# Patient Record
Sex: FEMALE | Race: WHITE | Employment: FULL TIME | URBAN - METROPOLITAN AREA
[De-identification: names, ages, dates, MRNs, and addresses within clinical notes are randomized per-mention and may not be internally consistent; named-entity substitution may affect disease eponyms.]

---

## 2021-04-15 ENCOUNTER — TRANSCRIBE ORDER (OUTPATIENT)
Dept: SCHEDULING | Age: 41
End: 2021-04-15

## 2021-04-15 DIAGNOSIS — R07.9 CHEST PAIN, UNSPECIFIED: Primary | ICD-10-CM

## 2021-04-23 ENCOUNTER — HOSPITAL ENCOUNTER (OUTPATIENT)
Dept: NON INVASIVE DIAGNOSTICS | Age: 41
Discharge: HOME | End: 2021-04-23
Payer: COMMERCIAL

## 2021-04-23 DIAGNOSIS — R07.9 CHEST PAIN, UNSPECIFIED: ICD-10-CM

## 2021-04-23 LAB
ECHO AO ROOT DIAM: 3.09 CM
ECHO AV AREA PEAK VELOCITY: 2.28 CM2
ECHO AV PEAK GRADIENT: 9.63 MMHG
ECHO AV PEAK VELOCITY: 155.17 CM/S
ECHO IVC PROX: 1.67 CM
ECHO LA AREA 4C: 17.32 CM2
ECHO LA MAJOR AXIS: 3.09 CM
ECHO LA VOL 2C: 54.89 ML (ref 22–52)
ECHO LA VOL 4C: 48.48 ML (ref 22–52)
ECHO LA VOL BP: 56.46 ML (ref 22–52)
ECHO LV INTERNAL DIMENSION DIASTOLIC: 4.26 CM (ref 3.9–5.3)
ECHO LV INTERNAL DIMENSION SYSTOLIC: 2.53 CM
ECHO LV IVSD: 0.6 CM (ref 0.6–0.9)
ECHO LV MASS 2D: 78.5 G (ref 67–162)
ECHO LV POSTERIOR WALL DIASTOLIC: 0.69 CM (ref 0.6–0.9)
ECHO LVOT DIAM: 1.99 CM
ECHO LVOT PEAK GRADIENT: 5.14 MMHG
ECHO LVOT PEAK VELOCITY: 113.37 CM/S
ECHO MV A VELOCITY: 80.51 CM/S
ECHO MV E DECELERATION TIME (DT): 165.88 MS
ECHO MV E VELOCITY: 85.24 CM/S
ECHO MV E/A RATIO: 1.06
ECHO PV PEAK INSTANTANEOUS GRADIENT SYSTOLIC: 2.3 MMHG
ECHO RV INTERNAL DIMENSION: 2.81 CM
ECHO RV TAPSE: 2.08 CM (ref 1.5–2)
ECHO TV REGURGITANT MAX VELOCITY: 178.97 CM/S
ECHO TV REGURGITANT PEAK GRADIENT: 12.81 MMHG
LA VOL DISK BP: 53.13 ML (ref 22–52)

## 2021-04-23 PROCEDURE — 93306 TTE W/DOPPLER COMPLETE: CPT

## 2021-12-07 ENCOUNTER — HOSPITAL ENCOUNTER (OUTPATIENT)
Dept: LAB | Age: 41
Discharge: HOME | End: 2021-12-07
Payer: OTHER GOVERNMENT

## 2021-12-07 ENCOUNTER — TRANSCRIBE ORDER (OUTPATIENT)
Dept: REGISTRATION | Age: 41
End: 2021-12-07

## 2021-12-07 DIAGNOSIS — D64.9 ANEMIA: ICD-10-CM

## 2021-12-07 DIAGNOSIS — D64.9 ANEMIA: Primary | ICD-10-CM

## 2021-12-07 LAB
HCT VFR BLD AUTO: 23.4 % (ref 36–46)
HGB BLD-MCNC: 7.2 G/DL (ref 13.5–17.5)

## 2021-12-07 PROCEDURE — 85018 HEMOGLOBIN: CPT

## 2021-12-07 PROCEDURE — 86920 COMPATIBILITY TEST SPIN: CPT

## 2021-12-07 PROCEDURE — 86901 BLOOD TYPING SEROLOGIC RH(D): CPT

## 2021-12-07 PROCEDURE — 86900 BLOOD TYPING SEROLOGIC ABO: CPT

## 2021-12-08 ENCOUNTER — HOSPITAL ENCOUNTER (OUTPATIENT)
Dept: INFUSION THERAPY | Age: 41
Discharge: HOME | End: 2021-12-08
Payer: OTHER GOVERNMENT

## 2021-12-08 VITALS
HEART RATE: 87 BPM | DIASTOLIC BLOOD PRESSURE: 76 MMHG | TEMPERATURE: 98.5 F | OXYGEN SATURATION: 100 % | SYSTOLIC BLOOD PRESSURE: 124 MMHG | RESPIRATION RATE: 18 BRPM

## 2021-12-08 LAB
HCT VFR BLD AUTO: 27.8 % (ref 36–46)
HGB BLD-MCNC: 8.6 G/DL (ref 13.5–17.5)

## 2021-12-08 PROCEDURE — 36415 COLL VENOUS BLD VENIPUNCTURE: CPT

## 2021-12-08 PROCEDURE — 85018 HEMOGLOBIN: CPT

## 2021-12-08 PROCEDURE — P9016 RBC LEUKOCYTES REDUCED: HCPCS

## 2021-12-08 PROCEDURE — 36430 TRANSFUSION BLD/BLD COMPNT: CPT

## 2021-12-08 RX ORDER — SODIUM CHLORIDE 9 MG/ML
250 INJECTION, SOLUTION INTRAVENOUS AS NEEDED
Status: DISCONTINUED | OUTPATIENT
Start: 2021-12-08 | End: 2021-12-10 | Stop reason: HOSPADM

## 2021-12-08 NOTE — PROGRESS NOTES
IV site discontinued discharged to go by [facility] office for further instructions

## 2021-12-08 NOTE — PROGRESS NOTES
Plan of care dicussed, IV started in right wrist with # 20 abbocath via [facility], lungs clear

## 2021-12-09 LAB
ABO + RH BLD: NORMAL
BLD PROD TYP BPU: NORMAL
BLOOD GROUP ANTIBODIES SERPL: NEGATIVE
BPU ID: NORMAL
CROSSMATCH RESULT,%XM: NORMAL
SPECIMEN EXP DATE BLD: NORMAL
STATUS OF UNIT,%ST: NORMAL
TRANSFUSION STATUS PATIENT QL: NORMAL
UNIT DIVISION, %UDIV: 0
UNIT TAG COMMENT,%CM: NORMAL

## 2021-12-14 ENCOUNTER — HOSPITAL ENCOUNTER (OUTPATIENT)
Dept: INFUSION THERAPY | Age: 41
Discharge: HOME | End: 2021-12-14
Payer: OTHER GOVERNMENT

## 2021-12-14 VITALS
HEART RATE: 88 BPM | TEMPERATURE: 97.8 F | RESPIRATION RATE: 18 BRPM | SYSTOLIC BLOOD PRESSURE: 117 MMHG | OXYGEN SATURATION: 99 % | DIASTOLIC BLOOD PRESSURE: 87 MMHG

## 2021-12-14 PROCEDURE — 74011250636 HC RX REV CODE- 250/636: Performed by: FAMILY MEDICINE

## 2021-12-14 PROCEDURE — 96365 THER/PROPH/DIAG IV INF INIT: CPT

## 2021-12-14 RX ORDER — MONTELUKAST SODIUM 10 MG/1
10 TABLET ORAL DAILY
COMMUNITY

## 2021-12-14 RX ADMIN — IRON SUCROSE 300 MG: 20 INJECTION, SOLUTION INTRAVENOUS at 10:24

## 2022-01-26 ENCOUNTER — HOSPITAL ENCOUNTER (OUTPATIENT)
Dept: INFUSION THERAPY | Age: 42
Discharge: HOME | End: 2022-01-26
Payer: OTHER GOVERNMENT

## 2022-01-26 VITALS
TEMPERATURE: 97.8 F | OXYGEN SATURATION: 100 % | SYSTOLIC BLOOD PRESSURE: 149 MMHG | HEART RATE: 80 BPM | RESPIRATION RATE: 20 BRPM | DIASTOLIC BLOOD PRESSURE: 88 MMHG

## 2022-01-26 PROCEDURE — 74011250636 HC RX REV CODE- 250/636: Performed by: FAMILY MEDICINE

## 2022-01-26 PROCEDURE — 74011000258 HC RX REV CODE- 258: Performed by: FAMILY MEDICINE

## 2022-01-26 PROCEDURE — 96365 THER/PROPH/DIAG IV INF INIT: CPT

## 2022-01-26 RX ORDER — NORETHINDRONE ACETATE AND ETHINYL ESTRADIOL 1; .02 MG/1; MG/1
1 TABLET ORAL DAILY
COMMUNITY

## 2022-01-26 RX ADMIN — IRON SUCROSE 300 MG: 20 INJECTION, SOLUTION INTRAVENOUS at 08:40

## 2023-03-09 ENCOUNTER — HOSPITAL ENCOUNTER (EMERGENCY)
Age: 43
Discharge: HOME | End: 2023-03-09
Attending: EMERGENCY MEDICINE
Payer: COMMERCIAL

## 2023-03-09 ENCOUNTER — APPOINTMENT (OUTPATIENT)
Dept: GENERAL RADIOLOGY | Age: 43
End: 2023-03-09
Attending: EMERGENCY MEDICINE
Payer: COMMERCIAL

## 2023-03-09 VITALS
SYSTOLIC BLOOD PRESSURE: 148 MMHG | RESPIRATION RATE: 18 BRPM | BODY MASS INDEX: 23.04 KG/M2 | HEART RATE: 86 BPM | OXYGEN SATURATION: 99 % | HEIGHT: 63 IN | WEIGHT: 130 LBS | DIASTOLIC BLOOD PRESSURE: 98 MMHG | TEMPERATURE: 97.9 F

## 2023-03-09 DIAGNOSIS — R03.0 ELEVATED BLOOD PRESSURE READING WITHOUT DIAGNOSIS OF HYPERTENSION: Primary | ICD-10-CM

## 2023-03-09 DIAGNOSIS — R42 DIZZINESS: ICD-10-CM

## 2023-03-09 DIAGNOSIS — R07.89 ATYPICAL CHEST PAIN: ICD-10-CM

## 2023-03-09 DIAGNOSIS — H57.11 PAIN OF RIGHT EYE: ICD-10-CM

## 2023-03-09 LAB
ALBUMIN SERPL-MCNC: 3.9 G/DL (ref 3.5–5)
ALBUMIN/GLOB SERPL: 1.2 (ref 1.1–2.2)
ALP SERPL-CCNC: 64 U/L (ref 45–117)
ALT SERPL-CCNC: 31 U/L (ref 12–78)
ANION GAP SERPL CALC-SCNC: 8 MMOL/L (ref 5–15)
AST SERPL W P-5'-P-CCNC: 16 U/L (ref 15–37)
BASOPHILS # BLD: 0.1 K/UL (ref 0–0.1)
BASOPHILS NFR BLD: 1 % (ref 0–1)
BILIRUB SERPL-MCNC: 0.4 MG/DL (ref 0.2–1)
BUN SERPL-MCNC: 8 MG/DL (ref 6–20)
BUN/CREAT SERPL: 11 (ref 12–20)
CA-I BLD-MCNC: 9.1 MG/DL (ref 8.5–10.1)
CHLORIDE SERPL-SCNC: 103 MMOL/L (ref 97–108)
CO2 SERPL-SCNC: 27 MMOL/L (ref 21–32)
CREAT SERPL-MCNC: 0.75 MG/DL (ref 0.55–1.02)
DIFFERENTIAL METHOD BLD: ABNORMAL
EOSINOPHIL # BLD: 0.2 K/UL (ref 0–0.4)
EOSINOPHIL NFR BLD: 2 % (ref 0–7)
ERYTHROCYTE [DISTWIDTH] IN BLOOD BY AUTOMATED COUNT: 11.7 % (ref 11.5–14.5)
GLOBULIN SER CALC-MCNC: 3.2 G/DL (ref 2–4)
GLUCOSE SERPL-MCNC: 102 MG/DL (ref 65–100)
HCT VFR BLD AUTO: 42.2 % (ref 35–47)
HGB BLD-MCNC: 14.6 G/DL (ref 11.5–16)
IMM GRANULOCYTES # BLD AUTO: 0.1 K/UL (ref 0–0.04)
IMM GRANULOCYTES NFR BLD AUTO: 1 % (ref 0–0.5)
LYMPHOCYTES # BLD: 2.3 K/UL (ref 0.8–3.5)
LYMPHOCYTES NFR BLD: 30 % (ref 12–49)
MCH RBC QN AUTO: 31.5 PG (ref 26–34)
MCHC RBC AUTO-ENTMCNC: 34.6 G/DL (ref 30–36.5)
MCV RBC AUTO: 91.1 FL (ref 80–99)
MONOCYTES # BLD: 0.7 K/UL (ref 0–1)
MONOCYTES NFR BLD: 9 % (ref 5–13)
NEUTS SEG # BLD: 4.3 K/UL (ref 1.8–8)
NEUTS SEG NFR BLD: 57 % (ref 32–75)
NRBC # BLD: 0 K/UL (ref 0–0.01)
NRBC BLD-RTO: 0 PER 100 WBC
PLATELET # BLD AUTO: 229 K/UL (ref 150–400)
PMV BLD AUTO: 10.3 FL (ref 8.9–12.9)
POTASSIUM SERPL-SCNC: 3.5 MMOL/L (ref 3.5–5.1)
PROT SERPL-MCNC: 7.1 G/DL (ref 6.4–8.2)
RBC # BLD AUTO: 4.63 M/UL (ref 3.8–5.2)
SODIUM SERPL-SCNC: 138 MMOL/L (ref 136–145)
TROPONIN I SERPL HS-MCNC: 5 NG/L (ref 0–51)
WBC # BLD AUTO: 7.6 K/UL (ref 3.6–11)

## 2023-03-09 PROCEDURE — 84484 ASSAY OF TROPONIN QUANT: CPT

## 2023-03-09 PROCEDURE — 99285 EMERGENCY DEPT VISIT HI MDM: CPT

## 2023-03-09 PROCEDURE — 71045 X-RAY EXAM CHEST 1 VIEW: CPT

## 2023-03-09 PROCEDURE — 80053 COMPREHEN METABOLIC PANEL: CPT

## 2023-03-09 PROCEDURE — 36415 COLL VENOUS BLD VENIPUNCTURE: CPT

## 2023-03-09 PROCEDURE — 93005 ELECTROCARDIOGRAM TRACING: CPT

## 2023-03-09 PROCEDURE — 85025 COMPLETE CBC W/AUTO DIFF WBC: CPT

## 2023-03-09 RX ORDER — ESCITALOPRAM OXALATE 10 MG/1
TABLET ORAL
COMMUNITY

## 2023-03-09 NOTE — ED PROVIDER NOTES
[facility] EMERGENCY DEPT  EMERGENCY DEPARTMENT HISTORY AND PHYSICAL EXAM      Date: 3/9/2023  Patient Name: Joanie Marshall  MRN: [id number]  [place]: 1980  Date of evaluation: 3/9/2023  Provider: Nataly Johnson MD   Note Started: 12:53 PM 3/9/23    HISTORY OF PRESENT ILLNESS     Chief Complaint   Patient presents with    Chest Pain    Dizziness       History Provided By: Patient    HPI: Joanie Marshall is a 37 y.o. female with history of heavy menses and anemia, has had dizziness and chest pain off and on for past two weeks. She feels weak and shaky. She denies Abd pain, NVD, cough, cold or congestion. Pain in her chest is transient coming and going lasting seconds to minutes and is substernal non-radiating but can be sharp. She also had an episode a right eye pain that was behind her right eye,without vision changes at that time. Additionally though, this morning she had a shifting sensation of her vision that was transient. Pt has had a remote history of anemia  requiring transfusions prior to her hysterectomy, but none since that time. PAST MEDICAL HISTORY   Past Medical History:  Past Medical History:   Diagnosis Date    Anemia     Asthma        Past Surgical History:  Past Surgical History:   Procedure Laterality Date    HX GYN      HX HYSTERECTOMY      HX MYOMECTOMY      HX TONSIL AND ADENOIDECTOMY         Family History:  Family History   Problem Relation Age of Onset    Hypertension Mother     No Known Problems Father        Social History:  Social History     Tobacco Use    Smoking status: Never    Smokeless tobacco: Never   Vaping Use    Vaping Use: Never used   Substance Use Topics    Alcohol use: Yes     Alcohol/week: 2.0 standard drinks     Types: 2 Glasses of wine per week     Comment: weekly    Drug use: Never       Allergies:   Allergies   Allergen Reactions    Iron Hives       PCP: Daniel Roman NP    Current Meds:   Discharge Medication List as of 3/9/2023  3:29 PM        CONTINUE these medications which have NOT CHANGED    Details   escitalopram oxalate (LEXAPRO) 10 mg tablet escitalopram 10 mg tablet   ORAL TAKE 1 TABLET ONCE DAY FOR ANXIETY., Historical Med      norethindrone-ethinyl estradiol (Junel 1/20, 21,) 1-20 mg-mcg tablet Take 1 Tablet by mouth daily. , Historical Med      montelukast (Singulair) 10 mg tablet Take 10 mg by mouth daily. Indications: controller medication for asthma, seasonal runny nose, Historical Med             PHYSICAL EXAM     ED Triage Vitals [03/09/23 1248]   ED Encounter Vitals Group      BP (!) 146/119      Pulse (Heart Rate) 94      Resp Rate 18      Temp       Temp src       O2 Sat (%) 100 %      Weight 130 lb      Height 5' 3\"      Physical Exam  Vitals and nursing note reviewed. Constitutional:       General: She is not in acute distress. Appearance: Normal appearance. She is not ill-appearing or toxic-appearing. HENT:      Head: Normocephalic. Mouth/Throat:      Mouth: Mucous membranes are moist.   Eyes:      Extraocular Movements: Extraocular movements intact. Conjunctiva/sclera: Conjunctivae normal.      Pupils: Pupils are equal, round, and reactive to light. Cardiovascular:      Rate and Rhythm: Normal rate and regular rhythm. Pulses: Normal pulses. Pulmonary:      Effort: Pulmonary effort is normal.   Abdominal:      General: Abdomen is flat. Palpations: Abdomen is soft. Tenderness: There is no abdominal tenderness. Musculoskeletal:         General: No swelling. Normal range of motion. Cervical back: Normal range of motion. Skin:     General: Skin is warm. Neurological:      General: No focal deficit present. Mental Status: She is alert and oriented to person, place, and time. Cranial Nerves: No cranial nerve deficit.    Psychiatric:         Mood and Affect: Mood normal.         SCREENINGS        No data recorded      LAB, EKG AND DIAGNOSTIC RESULTS   Labs:  Recent Results (from the past 12 hour(s))   EKG, 12 LEAD, INITIAL    Collection Time: 03/09/23 12:42 PM   Result Value Ref Range    Ventricular Rate 88 BPM    Atrial Rate 88 BPM    P-R Interval 136 ms    QRS Duration 86 ms    Q-T Interval 354 ms    QTC Calculation (Bezet) 429 ms    Calculated P Axis 71 degrees    Calculated R Axis 64 degrees    Calculated T Axis 50 degrees    Diagnosis Sinus rhythm    CBC WITH AUTOMATED DIFF    Collection Time: 03/09/23  1:00 PM   Result Value Ref Range    WBC 7.6 3.6 - 11.0 K/uL    RBC 4.63 3.80 - 5.20 M/uL    HGB 14.6 11.5 - 16.0 g/dL    HCT 42.2 35.0 - 47.0 %    MCV 91.1 80.0 - 99.0 FL    MCH 31.5 26.0 - 34.0 PG    MCHC 34.6 30.0 - 36.5 g/dL    RDW 11.7 11.5 - 14.5 %    PLATELET 936 437 - 237 K/uL    MPV 10.3 8.9 - 12.9 FL    NRBC 0.0 0.0  WBC    ABSOLUTE NRBC 0.00 0.00 - 0.01 K/uL    NEUTROPHILS 57 32 - 75 %    LYMPHOCYTES 30 12 - 49 %    MONOCYTES 9 5 - 13 %    EOSINOPHILS 2 0 - 7 %    BASOPHILS 1 0 - 1 %    IMMATURE GRANULOCYTES 1 (H) 0 - 0.5 %    ABS. NEUTROPHILS 4.3 1.8 - 8.0 K/UL    ABS. LYMPHOCYTES 2.3 0.8 - 3.5 K/UL    ABS. MONOCYTES 0.7 0.0 - 1.0 K/UL    ABS. EOSINOPHILS 0.2 0.0 - 0.4 K/UL    ABS. BASOPHILS 0.1 0.0 - 0.1 K/UL    ABS. IMM. GRANS. 0.1 (H) 0.00 - 0.04 K/UL    DF AUTOMATED     METABOLIC PANEL, COMPREHENSIVE    Collection Time: 03/09/23  1:00 PM   Result Value Ref Range    Sodium 138 136 - 145 mmol/L    Potassium 3.5 3.5 - 5.1 mmol/L    Chloride 103 97 - 108 mmol/L    CO2 27 21 - 32 mmol/L    Anion gap 8 5 - 15 mmol/L    Glucose 102 (H) 65 - 100 mg/dL    BUN 8 6 - 20 mg/dL    Creatinine 0.75 0.55 - 1.02 mg/dL    BUN/Creatinine ratio 11 (L) 12 - 20      eGFR >60 >60 ml/min/1.73m2    Calcium 9.1 8.5 - 10.1 mg/dL    Bilirubin, total 0.4 0.2 - 1.0 mg/dL    AST (SGOT) 16 15 - 37 U/L    ALT (SGPT) 31 12 - 78 U/L    Alk.  phosphatase 64 45 - 117 U/L    Protein, total 7.1 6.4 - 8.2 g/dL    Albumin 3.9 3.5 - 5.0 g/dL    Globulin 3.2 2.0 - 4.0 g/dL    A-G Ratio 1.2 1.1 - 2.2     TROPONIN-HIGH SENSITIVITY    Collection Time: 03/09/23  1:00 PM   Result Value Ref Range    Troponin-High Sensitivity 5 0 - 51 ng/L       EKG: Initial EKG interpreted by me. Sinus, rate 88, without ST elevation or depression    Radiologic Studies:  Non-plain film images such as CT, Ultrasound and MRI are read by the radiologist. Plain radiographic images are visualized and preliminarily interpreted by the ED Physician with the following findings: No effusion or opacity noted. Interpretation per the Radiologist below, if available at the time of this note:  XR CHEST PORT    Result Date: 3/9/2023  Clinical history: CP INDICATION:   CP COMPARISON: None FINDINGS: AP portable upright view of the chest demonstrates a stable  cardiopericardial silhouette. There is no pleural effusion. .There is no focal consolidation. .There is no pneumothorax. . Patient is on a cardiac monitor. No acute intrathoracic process is identified. PROCEDURES   Unless otherwise noted below, none. Procedures      CRITICAL CARE TIME   None    ED COURSE and DIFFERENTIAL DIAGNOSIS/MDM   CC/HPI Summary, DDx, ED Course, and Reassessment: Pt is a relatively healthy young woman w h/o anemia- persistent or recurrent anemia could account for feeling shaky, weak, CP. Will check hemoglobin and basic labs to also evaluate for heart disease, kidney, UTI, . Pt had thyroid check about one year ago. Pt has never been worked up for rheumatologic disease. Has never been diagnosed with high blood pressure is unsure why her pressure is elevated today. We discussed that this could be a 1 off due to being in the ED but may be part of a bigger constellation, she will follow-up with her PCP for interval blood pressure check. Disposition Considerations (Tests not done, Shared Decision Making, Pt Expectation of Test or Treatment.):  Patient and I had long discussion about neck steps.   Explained although ED work-up negative to date that she should pursue her symptoms since this is not a normal pattern for her nor for her age group and otherwise relatively healthy female. Patient Dors understanding and will return to see Dr. Daniel Reza. Records Reviewed (source and summary of external notes): Prior medical records and Nursing notes    Vitals:    Vitals:    03/09/23 1248 03/09/23 1259 03/09/23 1322 03/09/23 1529   BP: (!) 146/119  (!) 157/109 (!) 148/98   Pulse: 94  88 86   Resp: 18  18 18   Temp:  97.9 °F (36.6 °C)     SpO2: 100%  100% 99%   Weight: 59 kg (130 lb)      Height: 5' 3\" (1.6 m)                Patient was given the following medications:  Medications - No data to display    CONSULTS: (Who and What was discussed)  None     Social Determinants affecting Dx or Tx: None    FINAL IMPRESSION     1. Elevated blood pressure reading without diagnosis of hypertension    2. Dizziness    3. Atypical chest pain    4. Pain of right eye          DISPOSITION/PLAN   Discharged    Discharge Note: The patient is stable for discharge home. The signs, symptoms, diagnosis, and discharge instructions have been discussed, understanding conveyed, and agreed upon. The patient is to follow up as recommended or return to ER should their symptoms worsen. PATIENT REFERRED TO:  Follow-up Information       Follow up With Specialties Details Why Contact Info    Niesha Jo NP Nurse Practitioner  pls follow-up with Dr Daniel Reza for further evaluation and work-up [address] [place] [address]  [phone]      [facility] EMERGENCY DEPT Emergency Medicine  If symptoms worsen [address] [facility]  [phone]              DISCHARGE MEDICATIONS:  Discharge Medication List as of 3/9/2023  3:29 PM            DISCONTINUED MEDICATIONS:  Discharge Medication List as of 3/9/2023  3:29 PM          I am the Primary Clinician of Record: Soni Rodriguez MD (electronically signed)    (Please note that parts of this dictation were completed with voice recognition software.  Quite often unanticipated grammatical, syntax, homophones, and other interpretive errors are inadvertently transcribed by the computer software. Please disregards these errors.  Please excuse any errors that have escaped final proofreading.)

## 2023-03-09 NOTE — ED TRIAGE NOTES
Reports chest pain, shortness of breath, dizziness, blurred vision, feeling shaky for past 2 weeks, reports hx of anemia, sharp stabbing pain to right eye

## 2023-03-09 NOTE — DISCHARGE INSTRUCTIONS
Thank you! Thank you for allowing me to care for you in the emergency department. It is my goal to provide you with excellent care. If you have not received excellent quality care, please ask to speak to the nurse manager. Please fill out the survey that will come to you by mail or email since we listen to your feedback! Below you will find a list of your tests from today's visit. Should you have any questions, please do not hesitate to call the emergency department. Labs  Recent Results (from the past 12 hour(s))   EKG, 12 LEAD, INITIAL    Collection Time: 03/09/23 12:42 PM   Result Value Ref Range    Ventricular Rate 88 BPM    Atrial Rate 88 BPM    P-R Interval 136 ms    QRS Duration 86 ms    Q-T Interval 354 ms    QTC Calculation (Bezet) 429 ms    Calculated P Axis 71 degrees    Calculated R Axis 64 degrees    Calculated T Axis 50 degrees    Diagnosis Sinus rhythm    CBC WITH AUTOMATED DIFF    Collection Time: 03/09/23  1:00 PM   Result Value Ref Range    WBC 7.6 3.6 - 11.0 K/uL    RBC 4.63 3.80 - 5.20 M/uL    HGB 14.6 11.5 - 16.0 g/dL    HCT 42.2 35.0 - 47.0 %    MCV 91.1 80.0 - 99.0 FL    MCH 31.5 26.0 - 34.0 PG    MCHC 34.6 30.0 - 36.5 g/dL    RDW 11.7 11.5 - 14.5 %    PLATELET 862 058 - 367 K/uL    MPV 10.3 8.9 - 12.9 FL    NRBC 0.0 0.0  WBC    ABSOLUTE NRBC 0.00 0.00 - 0.01 K/uL    NEUTROPHILS 57 32 - 75 %    LYMPHOCYTES 30 12 - 49 %    MONOCYTES 9 5 - 13 %    EOSINOPHILS 2 0 - 7 %    BASOPHILS 1 0 - 1 %    IMMATURE GRANULOCYTES 1 (H) 0 - 0.5 %    ABS. NEUTROPHILS 4.3 1.8 - 8.0 K/UL    ABS. LYMPHOCYTES 2.3 0.8 - 3.5 K/UL    ABS. MONOCYTES 0.7 0.0 - 1.0 K/UL    ABS. EOSINOPHILS 0.2 0.0 - 0.4 K/UL    ABS. BASOPHILS 0.1 0.0 - 0.1 K/UL    ABS. IMM.  GRANS. 0.1 (H) 0.00 - 0.04 K/UL    DF AUTOMATED     METABOLIC PANEL, COMPREHENSIVE    Collection Time: 03/09/23  1:00 PM   Result Value Ref Range    Sodium 138 136 - 145 mmol/L    Potassium 3.5 3.5 - 5.1 mmol/L    Chloride 103 97 - 108 mmol/L    CO2 27 21 - 32 mmol/L    Anion gap 8 5 - 15 mmol/L    Glucose 102 (H) 65 - 100 mg/dL    BUN 8 6 - 20 mg/dL    Creatinine 0.75 0.55 - 1.02 mg/dL    BUN/Creatinine ratio 11 (L) 12 - 20      eGFR >60 >60 ml/min/1.73m2    Calcium 9.1 8.5 - 10.1 mg/dL    Bilirubin, total 0.4 0.2 - 1.0 mg/dL    AST (SGOT) 16 15 - 37 U/L    ALT (SGPT) 31 12 - 78 U/L    Alk. phosphatase 64 45 - 117 U/L    Protein, total 7.1 6.4 - 8.2 g/dL    Albumin 3.9 3.5 - 5.0 g/dL    Globulin 3.2 2.0 - 4.0 g/dL    A-G Ratio 1.2 1.1 - 2.2     TROPONIN-HIGH SENSITIVITY    Collection Time: 03/09/23  1:00 PM   Result Value Ref Range    Troponin-High Sensitivity 5 0 - 51 ng/L       Radiologic Studies  XR CHEST PORT   Final Result   No acute intrathoracic process is identified. CT Results  (Last 48 hours)      None          CXR Results  (Last 48 hours)                 03/09/23 1305  XR CHEST PORT Final result    Impression:  No acute intrathoracic process is identified. Narrative:  Clinical history: CP   INDICATION:   CP   COMPARISON: None       FINDINGS:   AP portable upright view of the chest demonstrates a stable  cardiopericardial   silhouette. There is no pleural effusion. .There is no focal consolidation. .There   is no pneumothorax. . Patient is on a cardiac monitor.                  ------------------------------------------------------------------------------------------------------------  The exam and treatment you received in the Emergency Department were for an urgent problem and are not intended as complete care. It is important that you follow-up with a doctor, nurse practitioner, or physician assistant to:  (1) confirm your diagnosis,  (2) re-evaluation of changes in your illness and treatment, and  (3) for ongoing care. Please take your discharge instructions with you when you go to your follow-up appointment. If you have any problem arranging a follow-up appointment, contact the Emergency Department.   If your symptoms become worse or you do not improve as expected and you are unable to reach your health care provider, please return to the Emergency Department. We are available 24 hours a day. If a prescription has been provided, please have it filled as soon as possible to prevent a delay in treatment. If you have any questions or reservations about taking the medication due to side effects or interactions with other medications, please call your primary care provider or contact the ER.

## 2023-03-10 LAB
ATRIAL RATE: 88 BPM
CALCULATED P AXIS, ECG09: 71 DEGREES
CALCULATED R AXIS, ECG10: 64 DEGREES
CALCULATED T AXIS, ECG11: 50 DEGREES
DIAGNOSIS, 93000: NORMAL
P-R INTERVAL, ECG05: 136 MS
Q-T INTERVAL, ECG07: 354 MS
QRS DURATION, ECG06: 86 MS
QTC CALCULATION (BEZET), ECG08: 429 MS
VENTRICULAR RATE, ECG03: 88 BPM

## 2023-05-12 RX ORDER — NORETHINDRONE ACETATE AND ETHINYL ESTRADIOL 1; .02 MG/1; MG/1
1 TABLET ORAL DAILY
COMMUNITY

## 2023-05-12 RX ORDER — ESCITALOPRAM OXALATE 10 MG/1
TABLET ORAL
COMMUNITY

## 2023-05-12 RX ORDER — MONTELUKAST SODIUM 10 MG/1
TABLET ORAL DAILY
COMMUNITY

## 2023-06-01 ENCOUNTER — HOSPITAL ENCOUNTER (OUTPATIENT)
Facility: HOSPITAL | Age: 43
End: 2023-06-01
Payer: COMMERCIAL

## 2023-06-01 ENCOUNTER — HOSPITAL ENCOUNTER (OUTPATIENT)
Facility: HOSPITAL | Age: 43
Discharge: HOME | End: 2023-06-01
Payer: COMMERCIAL

## 2023-06-01 DIAGNOSIS — R10.11 ABDOMINAL PAIN, RIGHT UPPER QUADRANT: ICD-10-CM

## 2023-06-01 PROCEDURE — 76705 ECHO EXAM OF ABDOMEN: CPT

## 2023-06-01 PROCEDURE — 78227 HEPATOBIL SYST IMAGE W/DRUG: CPT

## 2023-06-01 PROCEDURE — 3430000000 HC RX DIAGNOSTIC RADIOPHARMACEUTICAL: Performed by: FAMILY MEDICINE

## 2023-06-01 PROCEDURE — 6360000004 HC RX CONTRAST MEDICATION

## 2023-06-01 PROCEDURE — A9537 TC99M MEBROFENIN: HCPCS | Performed by: FAMILY MEDICINE

## 2023-06-01 RX ORDER — KIT FOR THE PREPARATION OF TECHNETIUM TC 99M MEBROFENIN 45 MG/10ML
6.95 INJECTION, POWDER, LYOPHILIZED, FOR SOLUTION INTRAVENOUS
Status: COMPLETED | OUTPATIENT
Start: 2023-06-01 | End: 2023-06-01

## 2023-06-01 RX ADMIN — SINCALIDE 1.2 MCG: 5 INJECTION, POWDER, LYOPHILIZED, FOR SOLUTION INTRAVENOUS at 13:00

## 2023-06-01 RX ADMIN — MEBROFENIN 6.95 MILLICURIE: 45 INJECTION, POWDER, LYOPHILIZED, FOR SOLUTION INTRAVENOUS at 12:15

## 2025-03-06 ENCOUNTER — HOSPITAL ENCOUNTER (EMERGENCY)
Facility: HOSPITAL | Age: 45
Discharge: HOME | End: 2025-03-06
Attending: EMERGENCY MEDICINE
Payer: COMMERCIAL

## 2025-03-06 VITALS
DIASTOLIC BLOOD PRESSURE: 97 MMHG | HEART RATE: 83 BPM | TEMPERATURE: 97.6 F | SYSTOLIC BLOOD PRESSURE: 153 MMHG | HEIGHT: 63 IN | WEIGHT: 130 LBS | OXYGEN SATURATION: 98 % | BODY MASS INDEX: 23.04 KG/M2 | RESPIRATION RATE: 18 BRPM

## 2025-03-06 DIAGNOSIS — M54.10 RADICULOPATHY AFFECTING UPPER EXTREMITY: Primary | ICD-10-CM

## 2025-03-06 PROCEDURE — 6360000002 HC RX W HCPCS: Performed by: EMERGENCY MEDICINE

## 2025-03-06 PROCEDURE — 96372 THER/PROPH/DIAG INJ SC/IM: CPT

## 2025-03-06 PROCEDURE — 99284 EMERGENCY DEPT VISIT MOD MDM: CPT

## 2025-03-06 PROCEDURE — 6370000000 HC RX 637 (ALT 250 FOR IP): Performed by: EMERGENCY MEDICINE

## 2025-03-06 RX ORDER — DEXAMETHASONE 0.5 MG/5ML
8 ELIXIR ORAL
Status: DISCONTINUED | OUTPATIENT
Start: 2025-03-06 | End: 2025-03-06

## 2025-03-06 RX ORDER — LIDOCAINE 4 G/G
1 PATCH TOPICAL DAILY
Qty: 30 EACH | Refills: 0 | Status: SHIPPED | OUTPATIENT
Start: 2025-03-06 | End: 2025-03-06

## 2025-03-06 RX ORDER — DEXAMETHASONE SODIUM PHOSPHATE 10 MG/ML
8 INJECTION, SOLUTION INTRAMUSCULAR; INTRAVENOUS ONCE
Status: COMPLETED | OUTPATIENT
Start: 2025-03-06 | End: 2025-03-06

## 2025-03-06 RX ORDER — HYDROCODONE BITARTRATE AND ACETAMINOPHEN 5; 325 MG/1; MG/1
1 TABLET ORAL EVERY 4 HOURS PRN
Qty: 18 TABLET | Refills: 0 | Status: SHIPPED | OUTPATIENT
Start: 2025-03-06 | End: 2025-03-06

## 2025-03-06 RX ORDER — KETOROLAC TROMETHAMINE 30 MG/ML
30 INJECTION, SOLUTION INTRAMUSCULAR; INTRAVENOUS
Status: COMPLETED | OUTPATIENT
Start: 2025-03-06 | End: 2025-03-06

## 2025-03-06 RX ORDER — PREDNISONE 20 MG/1
20 TABLET ORAL DAILY
Qty: 5 TABLET | Refills: 0 | Status: SHIPPED | OUTPATIENT
Start: 2025-03-06 | End: 2025-03-11

## 2025-03-06 RX ORDER — PREDNISONE 20 MG/1
20 TABLET ORAL DAILY
Qty: 5 TABLET | Refills: 0 | Status: SHIPPED | OUTPATIENT
Start: 2025-03-06 | End: 2025-03-06

## 2025-03-06 RX ORDER — LIDOCAINE 4 G/G
1 PATCH TOPICAL DAILY
Qty: 30 EACH | Refills: 0 | Status: SHIPPED | OUTPATIENT
Start: 2025-03-06 | End: 2025-04-05

## 2025-03-06 RX ORDER — LIDOCAINE 4 G/G
1 PATCH TOPICAL
Status: DISCONTINUED | OUTPATIENT
Start: 2025-03-06 | End: 2025-03-06 | Stop reason: HOSPADM

## 2025-03-06 RX ORDER — HYDROCODONE BITARTRATE AND ACETAMINOPHEN 5; 325 MG/1; MG/1
1 TABLET ORAL EVERY 4 HOURS PRN
Qty: 18 TABLET | Refills: 0 | Status: SHIPPED | OUTPATIENT
Start: 2025-03-06 | End: 2025-03-09

## 2025-03-06 RX ADMIN — KETOROLAC TROMETHAMINE 30 MG: 30 INJECTION, SOLUTION INTRAMUSCULAR at 11:34

## 2025-03-06 RX ADMIN — HYDROMORPHONE HYDROCHLORIDE 0.5 MG: 1 INJECTION, SOLUTION INTRAMUSCULAR; INTRAVENOUS; SUBCUTANEOUS at 12:27

## 2025-03-06 RX ADMIN — DEXAMETHASONE SODIUM PHOSPHATE 8 MG: 10 INJECTION, SOLUTION INTRAMUSCULAR; INTRAVENOUS at 11:34

## 2025-03-06 ASSESSMENT — PAIN DESCRIPTION - ORIENTATION: ORIENTATION: LEFT

## 2025-03-06 ASSESSMENT — LIFESTYLE VARIABLES
HOW OFTEN DO YOU HAVE A DRINK CONTAINING ALCOHOL: PATIENT DECLINED
HOW MANY STANDARD DRINKS CONTAINING ALCOHOL DO YOU HAVE ON A TYPICAL DAY: PATIENT DECLINED

## 2025-03-06 ASSESSMENT — PAIN SCALES - GENERAL
PAINLEVEL_OUTOF10: 9
PAINLEVEL_OUTOF10: 10
PAINLEVEL_OUTOF10: 4

## 2025-03-06 ASSESSMENT — PAIN DESCRIPTION - LOCATION: LOCATION: SHOULDER

## 2025-03-06 ASSESSMENT — PAIN - FUNCTIONAL ASSESSMENT
PAIN_FUNCTIONAL_ASSESSMENT: PREVENTS OR INTERFERES WITH MANY ACTIVE NOT PASSIVE ACTIVITIES
PAIN_FUNCTIONAL_ASSESSMENT: 0-10

## 2025-03-06 ASSESSMENT — PAIN DESCRIPTION - DESCRIPTORS: DESCRIPTORS: ACHING

## 2025-03-06 ASSESSMENT — PAIN DESCRIPTION - FREQUENCY: FREQUENCY: CONTINUOUS

## 2025-03-06 ASSESSMENT — PAIN DESCRIPTION - PAIN TYPE: TYPE: ACUTE PAIN

## 2025-03-06 ASSESSMENT — PAIN DESCRIPTION - ONSET: ONSET: ON-GOING

## 2025-03-06 NOTE — ED TRIAGE NOTES
Pt reports left shoulder pain, reports recent visit with PCP and a ortho urgent care, reports pain radiates down arm with numbness in fingers. Denies any injury. Pt reports she was dx with a pinched nerve.

## 2025-03-06 NOTE — ED PROVIDER NOTES
escitalopram 10 MG tablet  Commonly known as: LEXAPRO     montelukast 10 MG tablet  Commonly known as: SINGULAIR     norethindrone-ethinyl estradiol 1-20 MG-MCG per tablet  Commonly known as: MICROGESTIN 1/20                DISCONTINUED MEDICATIONS:  Current Discharge Medication List          I am the Primary Clinician of Record: Christi Amezquita MD (electronically signed)    (Please note that parts of this dictation were completed with voice recognition software. Quite often unanticipated grammatical, syntax, homophones, and other interpretive errors are inadvertently transcribed by the computer software. Please disregards these errors. Please excuse any errors that have escaped final proofreading.)     Christi Amezquita MD  03/07/25 3999

## 2025-03-06 NOTE — ED NOTES
Pt removed self from monitor multiple times during visit. No data able to collect after replacement to bp cuff, spo2 monitoring.